# Patient Record
Sex: MALE | Race: WHITE | NOT HISPANIC OR LATINO | Employment: OTHER | URBAN - METROPOLITAN AREA
[De-identification: names, ages, dates, MRNs, and addresses within clinical notes are randomized per-mention and may not be internally consistent; named-entity substitution may affect disease eponyms.]

---

## 2023-11-28 ENCOUNTER — APPOINTMENT (OUTPATIENT)
Dept: RADIOLOGY | Facility: CLINIC | Age: 28
End: 2023-11-28

## 2023-11-28 ENCOUNTER — OFFICE VISIT (OUTPATIENT)
Dept: URGENT CARE | Facility: CLINIC | Age: 28
End: 2023-11-28

## 2023-11-28 VITALS
SYSTOLIC BLOOD PRESSURE: 165 MMHG | OXYGEN SATURATION: 98 % | TEMPERATURE: 98.3 F | BODY MASS INDEX: 24.79 KG/M2 | DIASTOLIC BLOOD PRESSURE: 86 MMHG | HEIGHT: 72 IN | RESPIRATION RATE: 16 BRPM | HEART RATE: 81 BPM | WEIGHT: 183 LBS

## 2023-11-28 DIAGNOSIS — M54.2 NECK PAIN: Primary | ICD-10-CM

## 2023-11-28 DIAGNOSIS — M54.2 NECK PAIN: ICD-10-CM

## 2023-11-28 DIAGNOSIS — S13.4XXA WHIPLASH INJURY TO NECK, INITIAL ENCOUNTER: ICD-10-CM

## 2023-11-28 PROCEDURE — 72070 X-RAY EXAM THORAC SPINE 2VWS: CPT

## 2023-11-28 PROCEDURE — 72040 X-RAY EXAM NECK SPINE 2-3 VW: CPT

## 2023-11-28 PROCEDURE — G0382 LEV 3 HOSP TYPE B ED VISIT: HCPCS | Performed by: PHYSICIAN ASSISTANT

## 2023-11-28 RX ORDER — CYCLOBENZAPRINE HCL 10 MG
10 TABLET ORAL 3 TIMES DAILY PRN
Qty: 30 TABLET | Refills: 0 | Status: SHIPPED | OUTPATIENT
Start: 2023-11-28

## 2023-11-29 NOTE — PROGRESS NOTES
[facility]        NAME: Jericho Birch is a 29 y.o. male  : 1995    MRN: [id number]  DATE: 2023  TIME: 8:20 PM    Assessment and Plan   Neck pain [M54.2]  1. Neck pain  XR spine cervical 2 or 3 vw injury    CANCELED: XR spine thoracic 3 vw      2. Whiplash injury to neck, initial encounter  cyclobenzaprine (FLEXERIL) 10 mg tablet        Patient Instructions     Alternate ice and heat applications to area of pain  800mg ibuprofen every 8 hours alternated with 1000mg tylenol every 8 hours as needed for pain  Wet read of xray wnl  Follow up with PCP in 3-5 days. Proceed to  ER if symptoms worsen. Chief Complaint     Chief Complaint   Patient presents with    Head Injury     Ran into a steel beam with his head 3 weeks ago and has had ongoing neck pain since. History of Present Illness       33yo M presents c/o pain posterior neck and shoulders x 3 weeks s/p injury. Patient was running in the dark when he jammed his neck. He struck the top of his head on a steel beam.  He did not lose consciousness. He denies headaches or dizziness. He c/o tightness at the base of his neck. It is made worse by twisting his neck or extending his neck. He has not attempted any treatments. Review of Systems   Review of Systems   Constitutional:  Negative for fatigue and fever. Musculoskeletal:  Positive for arthralgias, myalgias, neck pain and neck stiffness. Negative for gait problem and joint swelling. Neurological:  Negative for weakness and numbness.          Current Medications       Current Outpatient Medications:     cyclobenzaprine (FLEXERIL) 10 mg tablet, Take 1 tablet (10 mg total) by mouth 3 (three) times a day as needed for muscle spasms, Disp: 30 tablet, Rfl: 0    Current Allergies     Allergies as of 2023    (No Known Allergies)            The following portions of the patient's history were reviewed and updated as appropriate: allergies, current medications, past family history, past medical history, past social history, past surgical history and problem list.     History reviewed. No pertinent past medical history. History reviewed. No pertinent surgical history. History reviewed. No pertinent family history. Medications have been verified. Objective   /86   Pulse 81   Temp 98.3 °F (36.8 °C)   Resp 16   Ht 6' (1.829 m)   Wt 83 kg (183 lb)   SpO2 98%   BMI 24.82 kg/m²   No LMP for male patient. Physical Exam     Physical Exam  Constitutional:       Appearance: Normal appearance. Cardiovascular:      Rate and Rhythm: Normal rate and regular rhythm. Pulmonary:      Effort: Pulmonary effort is normal.      Breath sounds: Normal breath sounds. Musculoskeletal:      Cervical back: Spasms (paraspinal) and tenderness (paraspinal muscles) present. No swelling, edema, deformity, erythema, signs of trauma, lacerations, rigidity, torticollis, bony tenderness or crepitus. Pain with movement (to paraspinal muscles) present. Normal range of motion. Thoracic back: Spasms and tenderness (paraspinal muscles) present. No swelling, edema, deformity, signs of trauma, lacerations or bony tenderness. Normal range of motion. No scoliosis. Neurological:      Mental Status: He is alert.      Patient neurovascularly intact

## 2023-11-29 NOTE — PATIENT INSTRUCTIONS
Alternate ice and heat applications to area of pain  800mg ibuprofen every 8 hours alternated with 1000mg tylenol every 8 hours as needed for pain  I will call you if there is any discrepancy with the radiologist's read of your xrays